# Patient Record
Sex: MALE | Race: BLACK OR AFRICAN AMERICAN | Employment: UNEMPLOYED | URBAN - METROPOLITAN AREA
[De-identification: names, ages, dates, MRNs, and addresses within clinical notes are randomized per-mention and may not be internally consistent; named-entity substitution may affect disease eponyms.]

---

## 2023-03-15 ENCOUNTER — HOSPITAL ENCOUNTER (EMERGENCY)
Age: 33
Discharge: HOME | End: 2023-03-16
Attending: EMERGENCY MEDICINE

## 2023-03-15 DIAGNOSIS — T40.601A OPIATE OVERDOSE, ACCIDENTAL OR UNINTENTIONAL, INITIAL ENCOUNTER (HCC): Primary | ICD-10-CM

## 2023-03-15 PROCEDURE — 99284 EMERGENCY DEPT VISIT MOD MDM: CPT

## 2023-03-15 RX ORDER — SODIUM CHLORIDE, SODIUM LACTATE, POTASSIUM CHLORIDE, AND CALCIUM CHLORIDE .6; .31; .03; .02 G/100ML; G/100ML; G/100ML; G/100ML
1000 INJECTION, SOLUTION INTRAVENOUS
Status: COMPLETED | OUTPATIENT
Start: 2023-03-16 | End: 2023-03-16

## 2023-03-16 ENCOUNTER — APPOINTMENT (OUTPATIENT)
Dept: GENERAL RADIOLOGY | Age: 33
End: 2023-03-16

## 2023-03-16 VITALS
WEIGHT: 160 LBS | HEIGHT: 68 IN | SYSTOLIC BLOOD PRESSURE: 140 MMHG | DIASTOLIC BLOOD PRESSURE: 80 MMHG | TEMPERATURE: 98 F | OXYGEN SATURATION: 92 % | HEART RATE: 95 BPM | RESPIRATION RATE: 20 BRPM | BODY MASS INDEX: 24.25 KG/M2

## 2023-03-16 LAB
ANION GAP SERPL CALC-SCNC: 5 MMOL/L (ref 2–11)
BASOPHILS # BLD: 0.1 K/UL (ref 0–0.2)
BASOPHILS NFR BLD: 0 % (ref 0–2)
BUN SERPL-MCNC: 10 MG/DL (ref 6–23)
CALCIUM SERPL-MCNC: 8.7 MG/DL (ref 8.3–10.4)
CHLORIDE SERPL-SCNC: 109 MMOL/L (ref 101–110)
CO2 SERPL-SCNC: 29 MMOL/L (ref 21–32)
CREAT SERPL-MCNC: 1.2 MG/DL (ref 0.8–1.5)
DIFFERENTIAL METHOD BLD: ABNORMAL
EOSINOPHIL # BLD: 0.1 K/UL (ref 0–0.8)
EOSINOPHIL NFR BLD: 1 % (ref 0.5–7.8)
ERYTHROCYTE [DISTWIDTH] IN BLOOD BY AUTOMATED COUNT: 12.4 % (ref 11.9–14.6)
ETHANOL SERPL-MCNC: 6 MG/DL (ref 0–0.08)
GLUCOSE SERPL-MCNC: 179 MG/DL (ref 65–100)
HCT VFR BLD AUTO: 47.9 % (ref 41.1–50.3)
HGB BLD-MCNC: 15.1 G/DL (ref 13.6–17.2)
IMM GRANULOCYTES # BLD AUTO: 0.4 K/UL (ref 0–0.5)
IMM GRANULOCYTES NFR BLD AUTO: 2 % (ref 0–5)
LYMPHOCYTES # BLD: 1.2 K/UL (ref 0.5–4.6)
LYMPHOCYTES NFR BLD: 7 % (ref 13–44)
MCH RBC QN AUTO: 29.4 PG (ref 26.1–32.9)
MCHC RBC AUTO-ENTMCNC: 31.5 G/DL (ref 31.4–35)
MCV RBC AUTO: 93.4 FL (ref 82–102)
MONOCYTES # BLD: 1 K/UL (ref 0.1–1.3)
MONOCYTES NFR BLD: 6 % (ref 4–12)
NEUTS SEG # BLD: 15 K/UL (ref 1.7–8.2)
NEUTS SEG NFR BLD: 84 % (ref 43–78)
NRBC # BLD: 0 K/UL (ref 0–0.2)
PLATELET # BLD AUTO: 220 K/UL (ref 150–450)
PMV BLD AUTO: 11.3 FL (ref 9.4–12.3)
POTASSIUM SERPL-SCNC: 4.3 MMOL/L (ref 3.5–5.1)
RBC # BLD AUTO: 5.13 M/UL (ref 4.23–5.6)
SODIUM SERPL-SCNC: 143 MMOL/L (ref 133–143)
WBC # BLD AUTO: 17.7 K/UL (ref 4.3–11.1)

## 2023-03-16 PROCEDURE — 82077 ASSAY SPEC XCP UR&BREATH IA: CPT

## 2023-03-16 PROCEDURE — 6360000002 HC RX W HCPCS: Performed by: EMERGENCY MEDICINE

## 2023-03-16 PROCEDURE — 96361 HYDRATE IV INFUSION ADD-ON: CPT

## 2023-03-16 PROCEDURE — 71045 X-RAY EXAM CHEST 1 VIEW: CPT

## 2023-03-16 PROCEDURE — 96374 THER/PROPH/DIAG INJ IV PUSH: CPT

## 2023-03-16 PROCEDURE — 85025 COMPLETE CBC W/AUTO DIFF WBC: CPT

## 2023-03-16 PROCEDURE — 2580000003 HC RX 258: Performed by: EMERGENCY MEDICINE

## 2023-03-16 PROCEDURE — 80048 BASIC METABOLIC PNL TOTAL CA: CPT

## 2023-03-16 RX ORDER — ONDANSETRON 2 MG/ML
4 INJECTION INTRAMUSCULAR; INTRAVENOUS
Status: COMPLETED | OUTPATIENT
Start: 2023-03-16 | End: 2023-03-16

## 2023-03-16 RX ADMIN — SODIUM CHLORIDE, POTASSIUM CHLORIDE, SODIUM LACTATE AND CALCIUM CHLORIDE 1000 ML: 600; 310; 30; 20 INJECTION, SOLUTION INTRAVENOUS at 00:06

## 2023-03-16 RX ADMIN — ONDANSETRON 4 MG: 2 INJECTION INTRAMUSCULAR; INTRAVENOUS at 04:06

## 2023-03-16 ASSESSMENT — ENCOUNTER SYMPTOMS
FACIAL SWELLING: 0
SHORTNESS OF BREATH: 0
DIARRHEA: 0
ABDOMINAL PAIN: 0
VOMITING: 0

## 2023-03-16 ASSESSMENT — LIFESTYLE VARIABLES
HOW OFTEN DO YOU HAVE A DRINK CONTAINING ALCOHOL: NEVER
HOW MANY STANDARD DRINKS CONTAINING ALCOHOL DO YOU HAVE ON A TYPICAL DAY: PATIENT DOES NOT DRINK

## 2023-03-16 ASSESSMENT — PAIN SCALES - GENERAL: PAINLEVEL_OUTOF10: 0

## 2023-03-16 NOTE — ED NOTES
Patient sitting at end of bed, moving from side to side, not wanting to stay still. States he is thirsty, has had 2 apple juice, a soda, and now 4 cups of water at bedside.       Esme Glass RN  03/16/23 0280

## 2023-03-16 NOTE — ED NOTES
I have reviewed discharge instructions with the patient. The patient verbalized understanding. Patient left ED via Discharge Method: ambulatory to Home with self. Opportunity for questions and clarification provided. Patient given 0 scripts. To continue your aftercare when you leave the hospital, you may receive an automated call from our care team to check in on how you are doing. This is a free service and part of our promise to provide the best care and service to meet your aftercare needs.  If you have questions, or wish to unsubscribe from this service please call [phone]. Thank you for Choosing our [facility] Emergency Department.         Dilia Garrett RN  03/16/23 6960

## 2023-03-16 NOTE — ED NOTES
Patient has had multiple cups of water and soda, and this nurse attempted to collect urine, pt states he cannot urinate and when told I needed to do a straight cath patient told this nurse \"hell no you ain't\".       Huy Gibson RN  03/16/23 0185

## 2023-03-16 NOTE — ED TRIAGE NOTES
Patient arrives via EMS after overdosing on fentanyl. Ems gave narcan with improvement in mental status.  Patient alert and oriented upon arrival.

## 2023-03-16 NOTE — DISCHARGE INSTRUCTIONS
Return for worsening or concerning symptoms    [facility]   They have multiple resources to help you. Please call.  [email or web address]     Other local resources that are available are: The [address] [facility]. org for inpatient and outpatient substance abuse issues. Melissa [phone]  Medication assisted treatment    [address] [place] [address]  [phone]     Suicide Hotline   0-034-LQEVGQJ     Narcotics Anonymous   www.na. org  Alcoholics Anonymous  www.aa.org

## 2023-03-16 NOTE — ED PROVIDER NOTES
Emergency Department Provider Note                   PCP:                None None               Age: 26 y.o. Sex: male     DISPOSITION Discharge - Pending Orders Complete 03/16/2023 05:44:34 AM       ICD-10-CM    1. Opiate overdose, accidental or unintentional, initial encounter [facility])  T40.601A           MEDICAL DECISION MAKING  Complexity of Problems Addressed:  1 or more acute illnesses that pose a threat to life or bodily function. Data Reviewed and Analyzed:  Category 1:   I ordered each unique test.  I reviewed the results of each unique test.    The patients assessment required an independent historian: EMS gave history due to AMS    Category 2:   I independently ordered and reviewed the X-rays. No acute finding, agree with radiologist interpretation  I independently ordered and reviewed the labs    Category 3: Discussion of management or test interpretation. We will observe for several hours due to possible recurrence of symptoms. Patient easily arousable on multiple rechecks. Had 1 episode of vomiting was given Zofran. Given information for rehabilitation if he so wishes. Refused to provide urine sample or get catheterized in the emergency department. Risk of Complications and/or Morbidity of Patient Management:  Patient was discharged risks and benefits of hospitalization were considered and Shared medical decision making was utilized in creating the patients health plan today. Yogi Lange is a 26 y.o. male who presents to the Emergency Department with chief complaint of    Chief Complaint   Patient presents with    Drug Overdose      25-year-old male presents after a fentanyl overdose. He admits to snorting it while at a motel. He reports recreational use, not daily. Bystanders called EMS when he became unresponsive. He had sonorous respirations upon their arrival with sats in the 50s. He was given 0.5mg of Narcan with significant improvement.   He has no current symptoms. No vomiting. Review of Systems   Constitutional:  Negative for fever. HENT:  Negative for facial swelling. Eyes:  Negative for visual disturbance. Respiratory:  Negative for shortness of breath. Cardiovascular:  Negative for chest pain. Gastrointestinal:  Negative for abdominal pain, diarrhea and vomiting. Musculoskeletal:  Negative for joint swelling. Skin:  Negative for rash. Neurological:  Negative for speech difficulty. Psychiatric/Behavioral:  Negative for confusion. All other systems reviewed and are negative. Vitals signs and nursing note reviewed. Patient Vitals for the past 4 hrs:   Pulse Resp BP   03/16/23 0402 95 -- --   03/16/23 0352 (!) 117 20 --   03/16/23 0342 98 -- --   03/16/23 0332 90 -- --   03/16/23 0322 93 -- --   03/16/23 0312 96 11 --   03/16/23 0252 (!) 138 19 --   03/16/23 0242 98 10 --   03/16/23 0230 98 12 (!) 140/80   03/16/23 0220 (!) 101 -- --   03/16/23 0210 95 11 --   03/16/23 0200 98 16 (!) 132/90   03/16/23 0150 (!) 143 17 --          Physical Exam  Vitals and nursing note reviewed. Constitutional:       Appearance: Normal appearance. HENT:      Head: Normocephalic and atraumatic. Nose: Nose normal.      Mouth/Throat:      Mouth: Mucous membranes are moist.   Eyes:      Extraocular Movements: Extraocular movements intact. Pupils: Pupils are equal, round, and reactive to light. Cardiovascular:      Rate and Rhythm: Normal rate and regular rhythm. Pulmonary:      Effort: Pulmonary effort is normal. No respiratory distress. Abdominal:      General: Abdomen is flat. There is no distension. Musculoskeletal:         General: No deformity. Normal range of motion. Cervical back: Normal range of motion and neck supple. Skin:     General: Skin is warm and dry. Neurological:      General: No focal deficit present. Mental Status: He is alert. Mental status is at baseline.       Cranial Nerves: Cranial nerves 2-12 are intact. Sensory: Sensation is intact. Motor: Motor function is intact. Psychiatric:         Mood and Affect: Mood normal.         Speech: Speech is delayed and slurred. Procedures     Orders Placed This Encounter   Procedures    XR CHEST PORTABLE    BMP    CBC with Auto Differential    Urine Drug Screen    Ethanol    Urinalysis w rflx microscopic    Cardiac Monitor - ED Only    [facility]    Urinary catheter straight        Medications   lactated ringers bolus (0 mLs IntraVENous Stopped 3/16/23 0401)   ondansetron (ZOFRAN) injection 4 mg (4 mg IntraVENous Given 3/16/23 0406)       New Prescriptions    No medications on file        No past medical history on file. No past surgical history on file. No family history on file. Social History     Socioeconomic History    Marital status: Single        Allergies: Patient has no allergy information on record. Previous Medications    No medications on file        Results for orders placed or performed during the hospital encounter of 03/15/23   XR CHEST PORTABLE    Narrative    Exam:  Single view chest    Date: March 16, 2023    History: Shortness of breath, cough    Comparison: None available    Technique: Single frontal radiograph of the chest was obtained    Findings: The lung volumes are diminished accentuating the cardiac size. Incidental note   is made of an azygos lobe. The lungs are free of focal airspace consolidation. There is no pneumothorax or sizable pleural fluid collection. Impression    1. No acute intrathoracic process. 2. Low lung volumes.     Slot 63       April Turcios M.D.   3/16/2023 4:08:00 AM   BMP   Result Value Ref Range    Sodium 143 133 - 143 mmol/L    Potassium 4.3 3.5 - 5.1 mmol/L    Chloride 109 101 - 110 mmol/L    CO2 29 21 - 32 mmol/L    Anion Gap 5 2 - 11 mmol/L    Glucose 179 (H) 65 - 100 mg/dL    BUN 10 6 - 23 MG/DL    Creatinine 1.20 0.8 - 1.5 MG/DL    Est, Glom Filt Rate >60 >60 ml/min/1.73m2    Calcium 8.7 8.3 - 10.4 MG/DL   CBC with Auto Differential   Result Value Ref Range    WBC 17.7 (H) 4.3 - 11.1 K/uL    RBC 5.13 4.23 - 5.6 M/uL    Hemoglobin 15.1 13.6 - 17.2 g/dL    Hematocrit 47.9 41.1 - 50.3 %    MCV 93.4 82 - 102 FL    MCH 29.4 26.1 - 32.9 PG    MCHC 31.5 31.4 - 35.0 g/dL    RDW 12.4 11.9 - 14.6 %    Platelets 763 616 - 133 K/uL    MPV 11.3 9.4 - 12.3 FL    nRBC 0.00 0.0 - 0.2 K/uL    Differential Type AUTOMATED      Seg Neutrophils 84 (H) 43 - 78 %    Lymphocytes 7 (L) 13 - 44 %    Monocytes 6 4.0 - 12.0 %    Eosinophils % 1 0.5 - 7.8 %    Basophils 0 0.0 - 2.0 %    Immature Granulocytes 2 0.0 - 5.0 %    Segs Absolute 15.0 (H) 1.7 - 8.2 K/UL    Absolute Lymph # 1.2 0.5 - 4.6 K/UL    Absolute Mono # 1.0 0.1 - 1.3 K/UL    Absolute Eos # 0.1 0.0 - 0.8 K/UL    Basophils Absolute 0.1 0.0 - 0.2 K/UL    Absolute Immature Granulocyte 0.4 0.0 - 0.5 K/UL   Ethanol   Result Value Ref Range    Ethanol Lvl 6 MG/DL        XR CHEST PORTABLE   Final Result   1. No acute intrathoracic process. 2. Low lung volumes. Slot 61          Jacqueline Mosqueda M.D.    3/16/2023 4:08:00 AM                        Voice dictation software was used during the making of this note. This software is not perfect and grammatical and other typographical errors may be present. This note has not been completely proofread for errors.      Byron Scott MD  03/16/23 [address] [facility] [address], [place]  03/16/23 4834